# Patient Record
Sex: MALE | Race: WHITE | Employment: OTHER | URBAN - METROPOLITAN AREA
[De-identification: names, ages, dates, MRNs, and addresses within clinical notes are randomized per-mention and may not be internally consistent; named-entity substitution may affect disease eponyms.]

---

## 2021-01-15 ENCOUNTER — HOSPITAL ENCOUNTER (EMERGENCY)
Age: 76
Discharge: HOME | End: 2021-01-15
Attending: EMERGENCY MEDICINE
Payer: MEDICARE

## 2021-01-15 VITALS
SYSTOLIC BLOOD PRESSURE: 110 MMHG | TEMPERATURE: 97.6 F | HEIGHT: 68 IN | HEART RATE: 53 BPM | WEIGHT: 152 LBS | OXYGEN SATURATION: 95 % | RESPIRATION RATE: 18 BRPM | BODY MASS INDEX: 23.04 KG/M2 | DIASTOLIC BLOOD PRESSURE: 71 MMHG

## 2021-01-15 DIAGNOSIS — Z48.89 ENCOUNTER FOR POST SURGICAL WOUND CHECK: Primary | ICD-10-CM

## 2021-01-15 LAB
BASOPHILS ABSOLUTE: 0.1 K/UL (ref 0–0.2)
BASOPHILS RELATIVE PERCENT: 1.2 %
EOSINOPHILS ABSOLUTE: 0.5 K/UL (ref 0–0.7)
EOSINOPHILS RELATIVE PERCENT: 5 %
HCT VFR BLD CALC: 48.2 % (ref 42–52)
HEMOGLOBIN: 16.3 G/DL (ref 14–18)
LYMPHOCYTES ABSOLUTE: 2.4 K/UL (ref 1–4.8)
LYMPHOCYTES RELATIVE PERCENT: 26.5 %
MCH RBC QN AUTO: 31.1 PG (ref 27–31.3)
MCHC RBC AUTO-ENTMCNC: 33.8 % (ref 33–37)
MCV RBC AUTO: 92.1 FL (ref 80–100)
MONOCYTES ABSOLUTE: 0.7 K/UL (ref 0.2–0.8)
MONOCYTES RELATIVE PERCENT: 8 %
NEUTROPHILS ABSOLUTE: 5.5 K/UL (ref 1.4–6.5)
NEUTROPHILS RELATIVE PERCENT: 59.3 %
PDW BLD-RTO: 13 % (ref 11.5–14.5)
PLATELET # BLD: 161 K/UL (ref 130–400)
RBC # BLD: 5.24 M/UL (ref 4.7–6.1)
WBC # BLD: 9.2 K/UL (ref 4.8–10.8)

## 2021-01-15 PROCEDURE — 36415 COLL VENOUS BLD VENIPUNCTURE: CPT

## 2021-01-15 PROCEDURE — 2500000003 HC RX 250 WO HCPCS: Performed by: EMERGENCY MEDICINE

## 2021-01-15 PROCEDURE — 99284 EMERGENCY DEPT VISIT MOD MDM: CPT

## 2021-01-15 PROCEDURE — 85025 COMPLETE CBC W/AUTO DIFF WBC: CPT

## 2021-01-15 PROCEDURE — 6370000000 HC RX 637 (ALT 250 FOR IP): Performed by: EMERGENCY MEDICINE

## 2021-01-15 RX ORDER — CLOPIDOGREL BISULFATE 75 MG/1
75 TABLET ORAL DAILY
COMMUNITY

## 2021-01-15 RX ORDER — ASPIRIN 325 MG
325 TABLET ORAL DAILY
COMMUNITY

## 2021-01-15 RX ADMIN — Medication: at 04:50

## 2021-01-15 RX ADMIN — GELATIN ABSORBABLE SPONGE 12-7 MM 1 EACH: 12-7 MISC at 05:05

## 2021-01-15 SDOH — HEALTH STABILITY: MENTAL HEALTH: HOW OFTEN DO YOU HAVE A DRINK CONTAINING ALCOHOL?: NEVER

## 2021-01-15 ASSESSMENT — ENCOUNTER SYMPTOMS
COUGH: 0
WHEEZING: 0
EYE DISCHARGE: 0
VOMITING: 0
CHEST TIGHTNESS: 0
SHORTNESS OF BREATH: 0
ABDOMINAL DISTENTION: 0
ABDOMINAL PAIN: 0
PHOTOPHOBIA: 0
SORE THROAT: 0

## 2021-01-15 ASSESSMENT — PAIN DESCRIPTION - PAIN TYPE: TYPE: ACUTE PAIN

## 2021-01-15 ASSESSMENT — PAIN DESCRIPTION - ORIENTATION: ORIENTATION: LEFT

## 2021-01-15 ASSESSMENT — PAIN DESCRIPTION - LOCATION: LOCATION: EAR

## 2021-01-15 NOTE — ED TRIAGE NOTES
Pt states he had a minor surgery on his ear yesterday at [facility] for melanoma. States he went to sleep tonight and woke up with bleeding form the site. Pt alert and oriented x 4. Skin pink, warm, dry. Respirations even and unlabored. No distress noted at this time.

## 2021-01-15 NOTE — ED NOTES
Distal left ear post op site treated with anticoag powder and gel. Bleeding controlled. Will monitor for effectiveness before d/c.       Luisito Meza RN  01/15/21 1337

## 2021-01-15 NOTE — ED NOTES
Left ear reassessed. Bleeding still controlled. DSD applied over anticoag rx.      Violeta Zapata RN  01/15/21 3836

## 2021-01-15 NOTE — ED PROVIDER NOTES
[address] [facility] ED  eMERGENCY dEPARTMENT eNCOUnter      Pt Name: Vahid Yadav  MRN: [id number]  [place] 1945  Date of evaluation: 1/15/2021  Provider: Alanna Gavin MD    CHIEF COMPLAINT       Chief Complaint   Patient presents with    Post-op Problem         HISTORY OF PRESENT ILLNESS   (Location/Symptom, Timing/Onset,Context/Setting, Quality, Duration, Modifying Factors, Severity)  Note limiting factors. Vahid Yadav is a 76 y.o. male who presents to the emergency department for evaluation of postoperative bleeding. Yesterday patient was seen by the dermatologist and had melanoma removed from the top of his left ear. Had the area cauterized and wrapped. Is to change the dressing in 24 hours but overnight it bled quite a bit while he was sleeping and he had a pool of blood on his pillow and a lot of dried blood around his ear and left side of his face. He is not lightheaded or dizzy. He has no other complaints. Patient is on Plavix. He has minimal postoperative pain. HPI    NursingNotes were reviewed. REVIEW OF SYSTEMS    (2-9 systems for level 4, 10 or more for level 5)     Review of Systems   Constitutional: Negative for chills and diaphoresis. HENT: Negative for congestion, ear pain, mouth sores and sore throat. Eyes: Negative for photophobia and discharge. Respiratory: Negative for cough, chest tightness, shortness of breath and wheezing. Cardiovascular: Negative for chest pain and palpitations. Gastrointestinal: Negative for abdominal distention, abdominal pain and vomiting. Endocrine: Negative for cold intolerance. Genitourinary: Negative for difficulty urinating. Musculoskeletal: Negative for arthralgias. Skin: Negative for pallor and rash. Allergic/Immunologic: Negative for immunocompromised state. Neurological: Negative for dizziness and syncope. Hematological: Negative for adenopathy.    Psychiatric/Behavioral: Negative for agitation and time ofthis note:    No orders to display         ED BEDSIDE ULTRASOUND:   Performed by ED Physician - none    LABS:  Labs Reviewed   CBC WITH AUTO DIFFERENTIAL       All other labs were within normal range or not returned as of this dictation. EMERGENCY DEPARTMENT COURSE and DIFFERENTIAL DIAGNOSIS/MDM:   Vitals:    Vitals:    01/15/21 0434 01/15/21 0436 01/15/21 0530   BP:  127/68 110/71   Pulse: 61  53   Resp: 16  18   Temp: 97.6 °F (36.4 °C)     TempSrc: Temporal     SpO2: 97%  95%   Weight: 152 lb (68.9 kg)     Height: 5' 8\" (1.727 m)            MDM I was able to control the bleeding with CELOX and Gelfoam.  He will keep the dressing in place for another 48 hours and then change the dressing daily. CONSULTS:  None    PROCEDURES:  Unless otherwise noted below, none     Procedures    FINAL IMPRESSION      1.  Encounter for post surgical wound check          DISPOSITION/PLAN   DISPOSITION Decision To Discharge 01/15/2021 05:39:38 AM      PATIENT REFERRED TO:  Samira Lala DO  [address] [place] [address]    In 3 days        DISCHARGE MEDICATIONS:  New Prescriptions    No medications on file          (Please note that portions of this note were completed with a voice recognition program.  Efforts were made to edit the dictations but occasionally words are mis-transcribed.)    Saleem Shahid MD (electronically signed)  Attending Emergency Physician          Saleem Shahid MD  01/15/21 0488